# Patient Record
Sex: FEMALE | Race: WHITE | NOT HISPANIC OR LATINO
[De-identification: names, ages, dates, MRNs, and addresses within clinical notes are randomized per-mention and may not be internally consistent; named-entity substitution may affect disease eponyms.]

---

## 2020-10-27 PROBLEM — Z00.00 ENCOUNTER FOR PREVENTIVE HEALTH EXAMINATION: Status: ACTIVE | Noted: 2020-10-27

## 2022-04-10 ENCOUNTER — RESULT REVIEW (OUTPATIENT)
Age: 82
End: 2022-04-10

## 2023-12-18 ENCOUNTER — RESULT REVIEW (OUTPATIENT)
Age: 83
End: 2023-12-18

## 2023-12-18 ENCOUNTER — NON-APPOINTMENT (OUTPATIENT)
Age: 83
End: 2023-12-18

## 2023-12-18 ENCOUNTER — APPOINTMENT (OUTPATIENT)
Dept: ENDOCRINOLOGY | Facility: CLINIC | Age: 83
End: 2023-12-18
Payer: MEDICARE

## 2023-12-18 VITALS
HEIGHT: 62 IN | OXYGEN SATURATION: 97 % | SYSTOLIC BLOOD PRESSURE: 110 MMHG | HEART RATE: 80 BPM | BODY MASS INDEX: 30.73 KG/M2 | WEIGHT: 167 LBS | DIASTOLIC BLOOD PRESSURE: 76 MMHG

## 2023-12-18 DIAGNOSIS — E55.9 VITAMIN D DEFICIENCY, UNSPECIFIED: ICD-10-CM

## 2023-12-18 DIAGNOSIS — R79.89 OTHER SPECIFIED ABNORMAL FINDINGS OF BLOOD CHEMISTRY: ICD-10-CM

## 2023-12-18 DIAGNOSIS — M81.8 OTHER OSTEOPOROSIS W/OUT CURRENT PATHOLOGICAL FRACTURE: ICD-10-CM

## 2023-12-18 DIAGNOSIS — Z87.39 PERSONAL HISTORY OF OTHER DISEASES OF THE MUSCULOSKELETAL SYSTEM AND CONNECTIVE TISSUE: ICD-10-CM

## 2023-12-18 PROCEDURE — 99205 OFFICE O/P NEW HI 60 MIN: CPT

## 2023-12-18 RX ORDER — ATORVASTATIN CALCIUM 10 MG/1
10 TABLET, FILM COATED ORAL DAILY
Refills: 0 | Status: ACTIVE | COMMUNITY

## 2023-12-18 RX ORDER — COLCHICINE 0.6 MG/1
0.6 CAPSULE ORAL
Refills: 0 | Status: ACTIVE | COMMUNITY

## 2023-12-18 RX ORDER — ALENDRONATE SODIUM 70 MG/1
70 TABLET ORAL
Refills: 0 | Status: ACTIVE | COMMUNITY

## 2023-12-25 PROBLEM — M81.8 OTHER OSTEOPOROSIS: Status: ACTIVE | Noted: 2023-12-18

## 2023-12-25 PROBLEM — R79.89 ELEVATED PARATHYROID HORMONE: Status: ACTIVE | Noted: 2023-12-18

## 2023-12-25 NOTE — HISTORY OF PRESENT ILLNESS
[FreeTextEntry1] : Dec 18, 2023     PCP:  Dr. Pierce Stone  CC:  Osteoporosis.            -L1 compression fracture          -10/2020 :  comminuted right intertrochanteric hip fracture (post fall)           -4/2022  comminuted right distal radial and ulnar fracture          -8/2023  bone density:  FN T -2.8   82 yo accompanied by her , visits to discuss osteoporosis as noted on data above.  She has had very thorough evaluation and has been on appropriate treatment with alendronate.   Today she reports that she feels well  : : Constitutional:  Alert, well nourished, healthy appearance, no acute distress  Eyes:  No proptosis, no stare Thyroid: Pulmonary:  No respiratory distress, no accessory muscle use; normal rate and effort Cardiac:  Normal rate Vascular:  Endocrine:  No stigmata of Cushings Syndrome Musculoskeletal:  Normal gait, no involuntary movements Neurology:  No tremors Affect/Mood/Psych:  Oriented x 3; normal affect, normal insight/judgement, normal mood  . Impression:  Has had careful evaluation and treatment by Dr. Stone. Osteoporosis appears to be idiopathic.  Plan:  Continue regular ambulation with focus on fall prevention as is already underway. Strive to maintain total daily calcium intake of ~1000 mg/day Vitamin D intake to generated 25 OH vitamin D levels in range of 30 - 50.   She will go for updated lab tests. She has been on alendronate long enough to be considered for "holiday". After above, she may be a candidate for anabolic agent such as Evenity and we will discuss further at next visit.

## 2024-08-23 ENCOUNTER — APPOINTMENT (OUTPATIENT)
Dept: ENDOCRINOLOGY | Facility: CLINIC | Age: 84
End: 2024-08-23
Payer: MEDICARE

## 2024-08-23 VITALS
WEIGHT: 149.31 LBS | OXYGEN SATURATION: 95 % | BODY MASS INDEX: 27.48 KG/M2 | DIASTOLIC BLOOD PRESSURE: 70 MMHG | HEART RATE: 77 BPM | HEIGHT: 62 IN | SYSTOLIC BLOOD PRESSURE: 112 MMHG

## 2024-08-23 DIAGNOSIS — M81.8 OTHER OSTEOPOROSIS W/OUT CURRENT PATHOLOGICAL FRACTURE: ICD-10-CM

## 2024-08-23 DIAGNOSIS — E53.8 DEFICIENCY OF OTHER SPECIFIED B GROUP VITAMINS: ICD-10-CM

## 2024-08-23 DIAGNOSIS — R63.0 ANOREXIA: ICD-10-CM

## 2024-08-23 DIAGNOSIS — R70.0 ELEVATED ERYTHROCYTE SEDIMENTATION RATE: ICD-10-CM

## 2024-08-23 LAB
CORTIS SERPL-MCNC: 16.5 UG/DL
FSH SERPL-MCNC: 71.8 IU/L
LDH SERPL-CCNC: 206 U/L

## 2024-08-23 PROCEDURE — 99215 OFFICE O/P EST HI 40 MIN: CPT

## 2024-08-23 RX ORDER — DULOXETINE HYDROCHLORIDE 60 MG/1
60 CAPSULE, DELAYED RELEASE PELLETS ORAL
Refills: 0 | Status: ACTIVE | COMMUNITY

## 2024-08-23 NOTE — HISTORY OF PRESENT ILLNESS
[FreeTextEntry1] : Aug 23, 2024  in person    wgt 167 - 149     PCP:  Dr. Pierce Stone  CC:  Osteoporosis.            -L1 compression fracture          -10/2020 :  comminuted right intertrochanteric hip fracture (post fall)           -4/2022  comminuted right distal radial and ulnar fracture          -8/2023  bone density:  FN T -2.8   82 yo accompanied by her aid, visits to discuss osteoporosis as noted on data above.  She has had very thorough evaluation and has been on appropriate treatment with alendronate - now on "holida"   Has been noting weight loss - from 167 to 149 lbs.   Says her appetite is less, she usually has 2-3 BM?d Recently started on B12 shots.    : : Constitutional:  Alert, well nourished, healthy appearance, no acute distress  Eyes:  No proptosis, no stare Thyroid: Pulmonary:  No respiratory distress, no accessory muscle use; normal rate and effort Cardiac:  Normal rate Vascular:  Endocrine:  No stigmata of Cushings Syndrome Musculoskeletal:  Normal gait, no involuntary movements Neurology:  No tremors Affect/Mood/Psych:  Oriented x 3; normal affect, normal insight/judgement, normal mood  . Impression:  Osteoporosis Rx will wait until weight loss issue stabilized. Plan:  Will check cortisol this morning. [facility] f/u appoinment      Dec 18, 2023     PCP:  Dr. Pierce Stone  CC:  Osteoporosis.            -L1 compression fracture          -10/2020 :  comminuted right intertrochanteric hip fracture (post fall)           -4/2022  comminuted right distal radial and ulnar fracture          -8/2023  bone density:  FN T -2.8   82 yo accompanied by her , visits to discuss osteoporosis as noted on data above.  She has had very thorough evaluation and has been on appropriate treatment with alendronate.   Today she reports that she feels well  : : Constitutional:  Alert, well nourished, healthy appearance, no acute distress  Eyes:  No proptosis, no stare Thyroid: Pulmonary:  No respiratory distress, no accessory muscle use; normal rate and effort Cardiac:  Normal rate Vascular:  Endocrine:  No stigmata of Cushings Syndrome Musculoskeletal:  Normal gait, no involuntary movements Neurology:  No tremors Affect/Mood/Psych:  Oriented x 3; normal affect, normal insight/judgement, normal mood  . Impression:  Has had careful evaluation and treatment by Dr. Stone. Osteoporosis appears to be idiopathic.  Plan:  Continue regular ambulation with focus on fall prevention as is already underway. Strive to maintain total daily calcium intake of ~1000 mg/day Vitamin D intake to generated 25 OH vitamin D levels in range of 30 - 50.   She will go for updated lab tests. She has been on alendronate long enough to be considered for "holiday". After above, she may be a candidate for anabolic agent such as Evenity and we will discuss further at next visit.

## 2024-08-24 LAB
ACTH SER-ACNC: 12.1 PG/ML
ERYTHROCYTE [SEDIMENTATION RATE] IN BLOOD BY WESTERGREN METHOD: 20 MM/HR

## 2024-08-25 LAB
GLIADIN IGA SER QL: 0.2 U/ML
GLIADIN IGG SER QL: 1 U/ML
GLIADIN PEPTIDE IGA SER-ACNC: NEGATIVE
GLIADIN PEPTIDE IGG SER-ACNC: NEGATIVE
TTG IGA SER IA-ACNC: <0.5 U/ML
TTG IGA SER-ACNC: NEGATIVE
TTG IGG SER IA-ACNC: <0.8 U/ML
TTG IGG SER IA-ACNC: NEGATIVE

## 2024-08-27 LAB
IF BLOCK AB SER QL: 1.1 AU/ML
PCA AB SER QL IF: ABNORMAL

## 2024-08-29 LAB — VIT B6 SERPL-MCNC: 5.8 UG/L

## 2025-04-28 ENCOUNTER — APPOINTMENT (OUTPATIENT)
Dept: ENDOCRINOLOGY | Facility: CLINIC | Age: 85
End: 2025-04-28